# Patient Record
Sex: FEMALE | Employment: FULL TIME | URBAN - METROPOLITAN AREA
[De-identification: names, ages, dates, MRNs, and addresses within clinical notes are randomized per-mention and may not be internally consistent; named-entity substitution may affect disease eponyms.]

---

## 2020-02-12 NOTE — PROGRESS NOTES
HPI:  30 yr old female who presents for physical. New to clinic. . No children. Exercises regularly, at least 3 times per week. Eats healthy. 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      Had an episode of syncope after heart was rac ear canals clear. Brandon TMs intact with good landmarks noted. Nares patent. Oral mucous membrane moist.  Normal lips, teeth, and gums. Oropharynx normal.  Neck supple. Good ROM. No LAD.   Thyroid normal.  CV:  Regular rate and rhythm; no murmurs  Lungs:

## 2020-02-24 PROBLEM — N92.6 IRREGULAR MENSES: Status: ACTIVE | Noted: 2020-02-24

## 2020-02-24 NOTE — PROGRESS NOTES
HPI:    Patient ID: Kim Davila is a 30year old female. Patient referred by PCP due to irregular menses due to anovulation.   Patient's cycle was controlled with Mononessa but stopped a couple of months ago because she was not sure if she should co were placed in this encounter. Meds This Visit:  Requested Prescriptions     Signed Prescriptions Disp Refills   • Norgestimate-Eth Estradiol (MONONESSA) 0.25-35 MG-MCG Oral Tab 1 Package 3     Sig: Take 1 tablet by mouth daily.        Imaging & Referr

## 2021-06-23 NOTE — PROGRESS NOTES
HPI: Bill Aldana is a 27year old female who presents for rash. She generally gets a rash when the seasons change. Started on her scalp. Started in mid May. Used OTC cortisone without relief. Started on chest, arms and back of ears.      PMH:  No past medic

## 2022-10-31 PROBLEM — N97.9 FEMALE INFERTILITY: Status: ACTIVE | Noted: 2022-10-31

## 2022-11-10 NOTE — TELEPHONE ENCOUNTER
The Semen Analysis appears normal.  Patient should call on the first day of her next menses if she desires to start Clomid. Call patient.

## 2022-11-21 NOTE — TELEPHONE ENCOUNTER
Pt calling to let the dr know her period started. Pt stating medication will prescribed today. To help ovulate - clomid sp?   Pls advise

## 2022-11-21 NOTE — TELEPHONE ENCOUNTER
Patient name and  verified. Patient requesting rx for Clomid. States she had spotting that began yesterday but menstrual flow began today. Also asking when to begin Clomid?  Please advise

## 2022-12-11 ENCOUNTER — LAB ENCOUNTER (OUTPATIENT)
Dept: LAB | Facility: HOSPITAL | Age: 27
End: 2022-12-11
Attending: OBSTETRICS & GYNECOLOGY
Payer: COMMERCIAL

## 2022-12-11 DIAGNOSIS — N92.6 IRREGULAR MENSES: ICD-10-CM

## 2022-12-11 LAB — PROGEST SERPL-MCNC: 1.67 NG/ML

## 2022-12-11 PROCEDURE — 84144 ASSAY OF PROGESTERONE: CPT

## 2022-12-11 PROCEDURE — 36415 COLL VENOUS BLD VENIPUNCTURE: CPT

## 2023-02-20 ENCOUNTER — TELEPHONE (OUTPATIENT)
Dept: OBGYN CLINIC | Facility: CLINIC | Age: 28
End: 2023-02-20

## 2023-02-20 DIAGNOSIS — N92.6 IRREGULAR MENSES: Primary | ICD-10-CM

## 2023-02-20 RX ORDER — CLOMIPHENE CITRATE 50 MG/1
100 TABLET ORAL DAILY
Qty: 10 TABLET | Refills: 0 | Status: SHIPPED | OUTPATIENT
Start: 2023-02-20

## 2023-02-20 NOTE — TELEPHONE ENCOUNTER
Pt did one round of clomid in November. pls advise if okay to send script. Order pended for provider to review and approve if warranted.

## 2023-02-20 NOTE — TELEPHONE ENCOUNTER
Patient calling to inform that today is the 3rd day of menstrual to have medication prescribed per discussion with DM . Please call patient.

## 2023-02-21 ENCOUNTER — TELEPHONE (OUTPATIENT)
Dept: OBGYN CLINIC | Facility: CLINIC | Age: 28
End: 2023-02-21

## 2023-02-21 NOTE — TELEPHONE ENCOUNTER
Pt is calling the medication that prescribed is not available any pharmacy she called , Pt needs different medication

## 2023-09-21 NOTE — TELEPHONE ENCOUNTER
----- Message from Lissa Sims sent at 9/21/2023  1:30 PM CDT -----  Contact: Maureen lana [phone]  [email or web address]     Patient is calling for Medical Advice regarding:    How long has patient had these symptoms:    Pharmacy name and phone#:    Would like response via [employer]: call back    Comments: Pt is calling because she wants to be a new pt for this provider. She is looking for nurse Zoila regarding an appt        Patient name and  verified. Patient informed of ML[employer] message and recommendations. Order for Clomid 50 mg sent to pharmacy and day 21 progesterone ordered. Message also sent with instructions.